# Patient Record
Sex: MALE | NOT HISPANIC OR LATINO
[De-identification: names, ages, dates, MRNs, and addresses within clinical notes are randomized per-mention and may not be internally consistent; named-entity substitution may affect disease eponyms.]

---

## 2021-03-17 PROBLEM — Z00.00 ENCOUNTER FOR PREVENTIVE HEALTH EXAMINATION: Status: ACTIVE | Noted: 2021-03-17

## 2021-03-23 ENCOUNTER — APPOINTMENT (OUTPATIENT)
Dept: SURGERY | Facility: CLINIC | Age: 71
End: 2021-03-23
Payer: COMMERCIAL

## 2021-03-23 VITALS
HEIGHT: 69 IN | OXYGEN SATURATION: 97 % | WEIGHT: 185 LBS | BODY MASS INDEX: 27.4 KG/M2 | DIASTOLIC BLOOD PRESSURE: 85 MMHG | SYSTOLIC BLOOD PRESSURE: 121 MMHG | HEART RATE: 69 BPM

## 2021-03-23 DIAGNOSIS — I10 ESSENTIAL (PRIMARY) HYPERTENSION: ICD-10-CM

## 2021-03-23 DIAGNOSIS — E03.9 HYPOTHYROIDISM, UNSPECIFIED: ICD-10-CM

## 2021-03-23 DIAGNOSIS — Z85.46 PERSONAL HISTORY OF MALIGNANT NEOPLASM OF PROSTATE: ICD-10-CM

## 2021-03-23 DIAGNOSIS — Z72.89 OTHER PROBLEMS RELATED TO LIFESTYLE: ICD-10-CM

## 2021-03-23 PROCEDURE — 99072 ADDL SUPL MATRL&STAF TM PHE: CPT

## 2021-03-23 PROCEDURE — 99203 OFFICE O/P NEW LOW 30 MIN: CPT

## 2021-03-23 RX ORDER — LEVOTHYROXINE SODIUM 0.05 MG/1
50 TABLET ORAL
Refills: 0 | Status: ACTIVE | COMMUNITY

## 2021-03-23 RX ORDER — RAMIPRIL 10 MG/1
10 CAPSULE ORAL
Refills: 0 | Status: ACTIVE | COMMUNITY

## 2021-03-23 NOTE — PHYSICAL EXAM
[de-identified] : PAOLA, ALE in NAD [de-identified] : soft, NT, ND, +small reducible umbilical hernia, +left inguinal hernia

## 2021-03-23 NOTE — PLAN
[FreeTextEntry1] : 69 yo male with left inguinal/umblical hernia\par -discussed options with patient including open, laparoscopic/robotic repair with mesh\par -explained that it was not an emergency to have surgery at this time\par -patient to d/w wife and will call in when ready to schedule

## 2021-03-23 NOTE — HISTORY OF PRESENT ILLNESS
[de-identified] : 69 yo male presents for evaluation of left groin pain.  Patient first noticed some intermitting pain in October 2020 which was worse while he was active or standing.  Noticed a "swelling" in that region which got worse with straining (having a bowel movement, coughing, during sex).  He likes to stay active and work on cars and noticed that these activities also exacerbate the pain. He denies chronic cough, constipation or urinary symptoms.

## 2021-03-25 ENCOUNTER — TRANSCRIPTION ENCOUNTER (OUTPATIENT)
Age: 71
End: 2021-03-25

## 2021-10-06 PROBLEM — I10 ESSENTIAL HYPERTENSION: Status: ACTIVE | Noted: 2021-03-23

## 2022-02-01 ENCOUNTER — RESULT REVIEW (OUTPATIENT)
Age: 72
End: 2022-02-01

## 2022-06-14 ENCOUNTER — APPOINTMENT (OUTPATIENT)
Dept: SURGERY | Facility: CLINIC | Age: 72
End: 2022-06-14
Payer: COMMERCIAL

## 2022-06-14 VITALS
TEMPERATURE: 98.5 F | SYSTOLIC BLOOD PRESSURE: 128 MMHG | BODY MASS INDEX: 27.37 KG/M2 | WEIGHT: 184.8 LBS | OXYGEN SATURATION: 98 % | DIASTOLIC BLOOD PRESSURE: 87 MMHG | HEART RATE: 72 BPM | HEIGHT: 69 IN

## 2022-06-14 PROCEDURE — 99213 OFFICE O/P EST LOW 20 MIN: CPT

## 2022-06-14 RX ORDER — ASCORBIC ACID 500 MG
TABLET ORAL
Refills: 0 | Status: ACTIVE | COMMUNITY

## 2022-06-14 RX ORDER — MULTIVITAMIN
CAPSULE ORAL
Refills: 0 | Status: ACTIVE | COMMUNITY

## 2022-06-14 RX ORDER — ASPIRIN 81 MG
81 TABLET, DELAYED RELEASE (ENTERIC COATED) ORAL
Refills: 0 | Status: COMPLETED | COMMUNITY
End: 2022-06-14

## 2022-06-14 RX ORDER — CHROMIUM 200 MCG
TABLET ORAL
Refills: 0 | Status: ACTIVE | COMMUNITY

## 2022-06-14 NOTE — ASSESSMENT
[FreeTextEntry1] : Primary medical doctor: Dr. Ricardo\par \par Date: 6/14/2022\par \par Reason for referral left inguinal hernia\par \par This is a 71-year-old gentleman who was initially evaluated approximately a year ago by Dr. Bennett for a left inguinal hernia.  At that time the patient had complaints of episodic burning sensation in the left groin.  He had no complaints of pain.  Patient did not have his surgery and presents today to be reevaluated for a left inguinal hernia.  He has similar complaints.  He has no complaints of pain or radiation of pain.  His true complaint is just a burning sensation in the groin episodically.  This sensation is exacerbated when he is doing strenuous activity or in the standing position.  In the sitting position it is not present.  He also complains of feeling a lump in his left groin and he feels the lump is gotten a little larger.  He has had no bouts of nausea or vomiting no change in bowel habits.\par \par Physical examination patient examined erect and supine position.  He clearly has an obvious left inguinal hernia in the erect position.  It is approximately the size of my fist.  In the supine position most of the hernia is reducible but there is still a portion is not reducible.  Left scrotum and testicles within normal limits.\par \par He has a moderate sized right inguinal hernia as well.  The hernia is reducible in the right the right scrotum and testicles within normal limits.\par \par Patient has a chronically incarcerated umbilical hernia with an incision above the hernia.  This is from his laparoscopic lymph node dissection regarding his prostate cancer.  He is status post brachytherapy as well for prostate cancer.  According the patient his PSA now is 0.  According the patient he has 1 out of 20 lymph nodes that were positive\par \par Impression/plan, bilateral inguinal hernias (chronically incarcerated on the left), chronically incarcerated umbilical possibly incisional hernia, left groin burning sensation.  The patient clearly has an obvious left inguinal hernia on examination as well as an incidental finding for a right inguinal hernia.  I have discussed the various surgical approaches and given his laparoscopic abdominal procedure I think it is best approached in an open fashion.  Patient and myself discussed the pros and cons of laparoscopic versus open versus robotic.  Ultimately he is opted to proceed with my recommendations with going with an open left inguinal hernia repair with mesh.\par \par The indications alternatives and complication of the procedure discussed.  We have also discussed the various types of mesh to be used or could be used during this operation.  We have also discussed complications of the mesh and surgery without using the mesh and potential complications.  Written consent was obtained in the office today.\par \par

## 2022-07-19 ENCOUNTER — RESULT REVIEW (OUTPATIENT)
Age: 72
End: 2022-07-19

## 2022-07-22 ENCOUNTER — TRANSCRIPTION ENCOUNTER (OUTPATIENT)
Age: 72
End: 2022-07-22

## 2022-07-22 ENCOUNTER — APPOINTMENT (OUTPATIENT)
Dept: SURGERY | Facility: HOSPITAL | Age: 72
End: 2022-07-22

## 2022-08-09 ENCOUNTER — NON-APPOINTMENT (OUTPATIENT)
Age: 72
End: 2022-08-09

## 2022-08-09 ENCOUNTER — APPOINTMENT (OUTPATIENT)
Dept: SURGERY | Facility: CLINIC | Age: 72
End: 2022-08-09

## 2022-08-09 VITALS
DIASTOLIC BLOOD PRESSURE: 84 MMHG | OXYGEN SATURATION: 98 % | SYSTOLIC BLOOD PRESSURE: 130 MMHG | TEMPERATURE: 97.7 F | HEART RATE: 88 BPM

## 2022-08-09 PROCEDURE — 99024 POSTOP FOLLOW-UP VISIT: CPT

## 2022-08-09 NOTE — ASSESSMENT
[FreeTextEntry1] : Patient presents today for follow-up after open left inguinal hernia repair with mesh.  Patient without complaints and happy with results.\par \par No fever chills or sweats appetite is good normal bowel movements.\par \par Physical examination patient examined erect and supine position.  He has a very well-healing incision.  There is a nice healing ridge there is no evidence recurrence left scrotum and testicles within normal limits testicle nontender.  \par Impression/plan status post open left inguinal hernia repair patient doing well surgically no acute findings.  Patient to return here now on appearing basis\par \par Incidentally patient has a upper left back cyst.  He wished to have this removed since it is causing him discomfort.  I have asked the patient to return here in greater than 3 months time for reevaluation of the cyst at which point we can then schedule to have it excised.  I do not want to do it close to the time of surgery (his hernia repair) given the fact that he has fresh mesh and its more likely to get infected.  Patient will follow up with me sometime in October/November of this year.  He wants it out before December of this year.

## 2022-10-25 ENCOUNTER — APPOINTMENT (OUTPATIENT)
Dept: SURGERY | Facility: CLINIC | Age: 72
End: 2022-10-25

## 2022-10-25 VITALS
OXYGEN SATURATION: 98 % | HEART RATE: 80 BPM | SYSTOLIC BLOOD PRESSURE: 125 MMHG | DIASTOLIC BLOOD PRESSURE: 86 MMHG | TEMPERATURE: 98.7 F

## 2022-10-25 DIAGNOSIS — Z87.19 PERSONAL HISTORY OF OTHER DISEASES OF THE DIGESTIVE SYSTEM: ICD-10-CM

## 2022-10-25 PROCEDURE — 99213 OFFICE O/P EST LOW 20 MIN: CPT

## 2022-10-25 NOTE — ASSESSMENT
[FreeTextEntry1] : This is a 71-year-old gentleman with a longstanding history of having a cyst in his left upper back.  The cyst is causing him some discomfort he now wish to have this removed.\par \par Physical examination: Patient examined erect and supine position.  The back visually is unremarkable.  At the upper aspect the left back there is a cyst approximately 1 cm in size.  The cyst is not infected no surrounding erythema no fluctuance no tenderness.\par \par Impression/plan chronic back cyst we will plan for excision in the operating room.\par \par The indications alternatives and complication of procedure discussed.  Questions answered.  Written consent obtained in the office today.

## 2022-12-02 ENCOUNTER — RESULT REVIEW (OUTPATIENT)
Age: 72
End: 2022-12-02

## 2022-12-04 ENCOUNTER — RESULT REVIEW (OUTPATIENT)
Age: 72
End: 2022-12-04

## 2022-12-05 ENCOUNTER — APPOINTMENT (OUTPATIENT)
Dept: SURGERY | Facility: HOSPITAL | Age: 72
End: 2022-12-05

## 2022-12-05 ENCOUNTER — TRANSCRIPTION ENCOUNTER (OUTPATIENT)
Age: 72
End: 2022-12-05

## 2022-12-14 ENCOUNTER — NON-APPOINTMENT (OUTPATIENT)
Age: 72
End: 2022-12-14

## 2022-12-21 ENCOUNTER — APPOINTMENT (OUTPATIENT)
Dept: SURGERY | Facility: CLINIC | Age: 72
End: 2022-12-21

## 2022-12-21 VITALS
DIASTOLIC BLOOD PRESSURE: 77 MMHG | SYSTOLIC BLOOD PRESSURE: 140 MMHG | TEMPERATURE: 98.2 F | OXYGEN SATURATION: 99 % | HEART RATE: 67 BPM

## 2022-12-21 DIAGNOSIS — L72.0 EPIDERMAL CYST: ICD-10-CM

## 2022-12-21 PROCEDURE — 99024 POSTOP FOLLOW-UP VISIT: CPT

## 2022-12-21 NOTE — PLAN
[FreeTextEntry1] : Patient is approximately 2 weeks out from excision of a sebaceous cyst from his back.  Pathology confirms is an epidermal inclusion cyst that is benign.  He has no complaints.  On exam his incision is well-healed.  His sutures removed at this time.  There is no infection.  Steri-Strip reinforcement is placed.\par \par Plan: The patient will follow with us on as-needed basis.

## 2025-08-18 ENCOUNTER — NON-APPOINTMENT (OUTPATIENT)
Age: 75
End: 2025-08-18

## 2025-08-19 ENCOUNTER — APPOINTMENT (OUTPATIENT)
Dept: SURGERY | Facility: CLINIC | Age: 75
End: 2025-08-19
Payer: MEDICARE

## 2025-08-19 VITALS
TEMPERATURE: 99.1 F | WEIGHT: 185 LBS | HEIGHT: 69 IN | SYSTOLIC BLOOD PRESSURE: 130 MMHG | OXYGEN SATURATION: 98 % | BODY MASS INDEX: 27.4 KG/M2 | DIASTOLIC BLOOD PRESSURE: 80 MMHG | HEART RATE: 65 BPM

## 2025-08-19 PROCEDURE — 99203 OFFICE O/P NEW LOW 30 MIN: CPT

## 2025-08-19 RX ORDER — ROSUVASTATIN CALCIUM 5 MG/1
5 TABLET, FILM COATED ORAL
Refills: 0 | Status: ACTIVE | COMMUNITY